# Patient Record
Sex: FEMALE | Race: OTHER | Employment: UNEMPLOYED | URBAN - METROPOLITAN AREA
[De-identification: names, ages, dates, MRNs, and addresses within clinical notes are randomized per-mention and may not be internally consistent; named-entity substitution may affect disease eponyms.]

---

## 2017-02-01 ENCOUNTER — APPOINTMENT (OUTPATIENT)
Dept: RADIATION ONCOLOGY | Facility: HOSPITAL | Age: 47
End: 2017-02-01
Attending: RADIOLOGY
Payer: MEDICAID

## 2017-02-16 ENCOUNTER — OFFICE VISIT (OUTPATIENT)
Dept: RADIATION ONCOLOGY | Facility: HOSPITAL | Age: 47
End: 2017-02-16
Attending: RADIOLOGY
Payer: MEDICAID

## 2017-02-16 VITALS
HEIGHT: 63 IN | TEMPERATURE: 98 F | DIASTOLIC BLOOD PRESSURE: 68 MMHG | SYSTOLIC BLOOD PRESSURE: 114 MMHG | OXYGEN SATURATION: 99 % | RESPIRATION RATE: 18 BRPM | BODY MASS INDEX: 38.09 KG/M2 | HEART RATE: 64 BPM | WEIGHT: 215 LBS

## 2017-02-16 PROCEDURE — 99202 OFFICE O/P NEW SF 15 MIN: CPT

## 2017-02-16 RX ORDER — HYDROCHLOROTHIAZIDE 25 MG/1
TABLET ORAL
Refills: 0 | COMMUNITY
Start: 2016-12-12

## 2017-02-16 RX ORDER — ATORVASTATIN CALCIUM 20 MG/1
TABLET, FILM COATED ORAL
Refills: 0 | COMMUNITY
Start: 2017-02-06

## 2017-02-16 RX ORDER — ASPIRIN 81 MG/1
TABLET ORAL
Refills: 0 | COMMUNITY
Start: 2017-02-06

## 2017-02-16 RX ORDER — METOPROLOL SUCCINATE 100 MG/1
TABLET, EXTENDED RELEASE ORAL
Refills: 0 | COMMUNITY
Start: 2017-02-06

## 2017-02-16 RX ORDER — QUINAPRIL 40 MG/1
TABLET ORAL
Refills: 0 | COMMUNITY
Start: 2017-02-06

## 2017-02-16 RX ORDER — CANAGLIFLOZIN 300 MG/1
TABLET, FILM COATED ORAL
Refills: 0 | COMMUNITY
Start: 2017-01-09

## 2017-02-16 RX ORDER — CYANOCOBALAMIN 1000 UG/ML
INJECTION INTRAMUSCULAR; SUBCUTANEOUS
Refills: 4 | COMMUNITY
Start: 2017-02-06

## 2017-02-16 RX ORDER — FOLIC ACID 1 MG/1
TABLET ORAL
Refills: 1 | COMMUNITY
Start: 2017-02-06

## 2017-02-16 RX ORDER — INSULIN HUMAN 100 [IU]/ML
INJECTION, SUSPENSION SUBCUTANEOUS
Refills: 2 | COMMUNITY
Start: 2017-02-06

## 2017-02-16 RX ORDER — CHOLECALCIFEROL (VITAMIN D3) 1250 MCG
CAPSULE ORAL
Refills: 4 | COMMUNITY
Start: 2017-02-06

## 2017-02-16 RX ORDER — AMLODIPINE BESYLATE 2.5 MG/1
TABLET ORAL
Refills: 0 | COMMUNITY
Start: 2017-02-06

## 2017-02-16 NOTE — PROGRESS NOTES
Nursing Consultation Note  Patient: Michaela Croft  YOB: 1970  Age: 48year old  Radiation Oncologist: Dr. Gabriella Haji  Referring Physician: Marcelo Deng  Diagnosis:No diagnosis found.   Consult Date: 2/16/2017      Chemotherapy: yes  Labs: Re Listed    Past Medical History   Diagnosis Date   • Hypertension    • Hypercholesteremia    • Diabetes mellitus ([facility] [address]      type 2   • [facility]     Liver mets and adrenal mets. Pt treated with folfox and avastin finished 5 months ago.

## 2017-02-16 NOTE — CONSULTS
Knowledge Deficit Plan Of Care:    Problem:  Knowledge Deficit    Problems related to:    Stereotactic Radiosurgery  Radiation therapy    Interventions:  Show [employer] video presentation  Assess knowledge needs  Instruct on the disease process  Instruct reviewed with pt and side effects reviewed. Dr. Dorcas Lino assisted with translation. Pt to have pet scan on 2/28/17 at [facility].  She was asked to also make a follow up appt with Dr. Yanely Rose at [facility] for a second opinion pt and family agreed.   I called Mary Soria

## 2017-02-17 NOTE — PROGRESS NOTES
RADIATION ONCOLOGY NOTE    DATE OF VISIT: 2/16/2017    DIAGNOSIS :  Metastatic colon cancer with biopsy proven left adrenal metastasis, s/p systemic therapy.  PET/CT Pending,    Dear Mor White, and colleagues,    Thank you for asking us to s external (Colon resection at [facility] ([facility])); and cholecystectomy. PAST SOCIAL HISTORY   reports that she has never smoked. She does not have any smokeless tobacco history on file. She reports that she does not drink alcohol or use illicit drugs.     PAST F MD  Radiation Oncology  [email or web address]  [phone].    2/16/2017

## 2017-03-01 ENCOUNTER — APPOINTMENT (OUTPATIENT)
Dept: RADIATION ONCOLOGY | Facility: HOSPITAL | Age: 47
End: 2017-03-01
Attending: RADIOLOGY
Payer: MEDICAID

## (undated) NOTE — MR AVS SNAPSHOT
Shayy Morgan   2017 8:00 AM   Office Visit   MRN:  [id number]    Description:  Female : 8/3/1970   Provider:  Fabrizio Slater   Department:  [address] [place] [address]              Visit Summary      Natalie Rodgers